# Patient Record
Sex: FEMALE | Race: WHITE | Employment: FULL TIME | URBAN - METROPOLITAN AREA
[De-identification: names, ages, dates, MRNs, and addresses within clinical notes are randomized per-mention and may not be internally consistent; named-entity substitution may affect disease eponyms.]

---

## 2019-01-29 ENCOUNTER — HOSPITAL ENCOUNTER (OUTPATIENT)
Age: 44
Discharge: HOME | End: 2019-01-29
Attending: FAMILY MEDICINE
Payer: COMMERCIAL

## 2019-01-29 VITALS
TEMPERATURE: 98 F | HEIGHT: 65 IN | OXYGEN SATURATION: 100 % | RESPIRATION RATE: 20 BRPM | WEIGHT: 130 LBS | HEART RATE: 75 BPM | DIASTOLIC BLOOD PRESSURE: 64 MMHG | SYSTOLIC BLOOD PRESSURE: 112 MMHG | BODY MASS INDEX: 21.66 KG/M2

## 2019-01-29 DIAGNOSIS — J01.10 ACUTE NON-RECURRENT FRONTAL SINUSITIS: Primary | ICD-10-CM

## 2019-01-29 PROCEDURE — 99203 OFFICE O/P NEW LOW 30 MIN: CPT

## 2019-01-29 PROCEDURE — 99204 OFFICE O/P NEW MOD 45 MIN: CPT

## 2019-01-29 RX ORDER — AMOXICILLIN AND CLAVULANATE POTASSIUM 875; 125 MG/1; MG/1
1 TABLET, FILM COATED ORAL 2 TIMES DAILY
Qty: 14 TABLET | Refills: 0 | Status: SHIPPED | OUTPATIENT
Start: 2019-01-29 | End: 2019-02-05

## 2019-01-29 NOTE — ED INITIAL ASSESSMENT (HPI)
Pt presents to clinic c/o cold for up to 1 week. She reports pressure in head - fogginess, nausea, dizziness, feels tired, and sinus congestion. She took sudafed and tylenol. She is travelling on Friday.

## 2019-01-29 NOTE — ED PROVIDER NOTES
Patient Seen in: [address]    History   Patient presents with:  Cough/URI: under 1 week    Stated Complaint: SINUS PAIN    HPI    40year old patient with no significant PMHx presents with nasal congestion, sinus pressure canals without erythema  NOSE: nasal turbinates moderately enlarged, erythematous.  + b/l frontal sinus tenderness.    NECK: supple, no rigidity, no adenopathy  THROAT: MMM, post phaynx injected, no exudate, no swelling, no lesions, uvula midline, no trismu

## 2019-01-29 NOTE — ED NOTES
Pt discharged home stable in good condition. Reviewed meds and avs. Follow up as indicated. Pt verbalized understanding and agreed.